# Patient Record
Sex: MALE | Race: WHITE | NOT HISPANIC OR LATINO | Employment: FULL TIME | URBAN - METROPOLITAN AREA
[De-identification: names, ages, dates, MRNs, and addresses within clinical notes are randomized per-mention and may not be internally consistent; named-entity substitution may affect disease eponyms.]

---

## 2019-04-06 ENCOUNTER — HOSPITAL ENCOUNTER (OUTPATIENT)
Dept: OTHER | Facility: HOSPITAL | Age: 44
Discharge: HOME | End: 2019-04-06
Attending: FAMILY MEDICINE

## 2022-04-21 ENCOUNTER — OFFICE VISIT (OUTPATIENT)
Dept: FAMILY MEDICINE CLINIC | Facility: CLINIC | Age: 47
End: 2022-04-21

## 2022-04-21 VITALS
SYSTOLIC BLOOD PRESSURE: 127 MMHG | OXYGEN SATURATION: 100 % | BODY MASS INDEX: 23.55 KG/M2 | RESPIRATION RATE: 18 BRPM | HEIGHT: 72 IN | HEART RATE: 70 BPM | TEMPERATURE: 98 F | DIASTOLIC BLOOD PRESSURE: 91 MMHG | WEIGHT: 173.9 LBS

## 2022-04-21 DIAGNOSIS — Z13.220 SCREENING FOR LIPID DISORDERS: ICD-10-CM

## 2022-04-21 DIAGNOSIS — R53.83 FATIGUE, UNSPECIFIED TYPE: ICD-10-CM

## 2022-04-21 DIAGNOSIS — Z13.29 SCREENING FOR THYROID DISORDER: ICD-10-CM

## 2022-04-21 DIAGNOSIS — Z12.11 SCREEN FOR COLON CANCER: Primary | ICD-10-CM

## 2022-04-21 DIAGNOSIS — Z11.59 NEED FOR HEPATITIS C SCREENING TEST: ICD-10-CM

## 2022-04-21 PROCEDURE — 99204 OFFICE O/P NEW MOD 45 MIN: CPT

## 2022-04-21 NOTE — PROGRESS NOTES
Chief Complaint   Patient presents with   • Establish Care     Hasn't been going to a primary care since he was a child.    • Labs Only       Subjective          Molina Hughes presents to [facility]    He is here to establish care. Is is a recovering alcoholic. He's been sober for over a year. He has arthritis issues and takes tylenol or ibuprofen for the pain. He does not smoke. He does dip. Hes been dipping since he was 13 years old or so. He does not use street drugs but has in the past. (Has used acid, marijuana, etc). No prescription drug abuse.  He has herniated discs in his back. 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     He has no complaints today except for fatigue which he expects to be because of his age. He does go to counseling with his wife for anger issues. He has stated the only reason he is here today is because he wife wants him to be and he doesn't like medical practices.       Past History:  Medical History: has a past medical history of Allergic and Arthritis.   Surgical History: has no past surgical history on file.   Family History: family history includes Cancer in his father and mother.   Social History: reports that he has never smoked. His smokeless tobacco use includes chew. He reports previous alcohol use. Drug use questions deferred to the physician.  Allergies: Penicillins  (Not in a hospital admission)       Social History     Socioeconomic History   • Marital status:    Tobacco Use   • Smoking status: Never Smoker   • Smokeless tobacco: Current User     Types: Chew   • Tobacco comment: Has used for 33 years 1 can per day.   Vaping Use   • Vaping Use: Never used   Substance and Sexual Activity   • Alcohol use: Not Currently   • Drug use: Defer   • Sexual activity: Defer       Health Maintenance Due   Topic Date Due   • COLORECTAL CANCER SCREENING  Never done   • HEPATITIS C SCREENING  Never done  "      Objective     Vital Signs:   /91 (BP Location: Left arm, Patient Position: Sitting)   Pulse 70   Temp 98 °F (36.7 °C) (Temporal)   Resp 18   Ht 182.9 cm (72\")   Wt 78.9 kg (173 lb 14.4 oz)   SpO2 100%   BMI 23.59 kg/m²       Physical Exam  Constitutional:       Appearance: Normal appearance.   HENT:      Nose: Nose normal.      Mouth/Throat:      Mouth: Mucous membranes are moist.   Cardiovascular:      Rate and Rhythm: Normal rate and regular rhythm.      Pulses: Normal pulses.      Heart sounds: Normal heart sounds.   Pulmonary:      Effort: Pulmonary effort is normal.      Breath sounds: Normal breath sounds.   Skin:     General: Skin is warm and dry.   Neurological:      General: No focal deficit present.      Mental Status: He is alert and oriented to person, place, and time.   Psychiatric:         Mood and Affect: Affect is blunt.         Behavior: Behavior is agitated.          Review of Systems   Constitutional: Positive for fatigue.   All other systems reviewed and are negative.       Result Review :                 Assessment and Plan    Diagnoses and all orders for this visit:    1. Screen for colon cancer (Primary)  -     Cologuard - Stool, Per Rectum; Future    2. Screening for lipid disorders  -     Lipid Panel; Future    3. Screening for thyroid disorder  -     TSH; Future    4. Fatigue, unspecified type  -     CBC & Differential; Future  -     Comprehensive Metabolic Panel; Future    5. Need for hepatitis C screening test  -     Hepatitis C antibody; Future          Pt thought to be clinically stable at this time.    Follow Up   Return if symptoms worsen or fail to improve.  Patient was given instructions and counseling regarding his condition or for health maintenance advice. Please see specific information pulled into the AVS if appropriate.       "

## 2022-05-21 ENCOUNTER — LAB (OUTPATIENT)
Dept: LAB | Facility: HOSPITAL | Age: 47
End: 2022-05-21

## 2022-05-21 DIAGNOSIS — Z13.220 SCREENING FOR LIPID DISORDERS: ICD-10-CM

## 2022-05-21 DIAGNOSIS — R79.89 ABNORMAL CBC: ICD-10-CM

## 2022-05-21 DIAGNOSIS — Z13.29 SCREENING FOR THYROID DISORDER: ICD-10-CM

## 2022-05-21 DIAGNOSIS — Z80.9 FAMILY HISTORY OF CANCER: ICD-10-CM

## 2022-05-21 DIAGNOSIS — R53.83 FATIGUE, UNSPECIFIED TYPE: ICD-10-CM

## 2022-05-21 DIAGNOSIS — Z12.5 SCREENING FOR PROSTATE CANCER: ICD-10-CM

## 2022-05-21 DIAGNOSIS — R42 DIZZINESS: ICD-10-CM

## 2022-05-21 DIAGNOSIS — Z11.59 NEED FOR HEPATITIS C SCREENING TEST: ICD-10-CM

## 2022-05-21 LAB
ALBUMIN SERPL-MCNC: 4.6 G/DL (ref 3.5–5.2)
ALBUMIN/GLOB SERPL: 2.4 G/DL
ALP SERPL-CCNC: 54 U/L (ref 39–117)
ALT SERPL W P-5'-P-CCNC: 23 U/L (ref 1–41)
ANION GAP SERPL CALCULATED.3IONS-SCNC: 9.9 MMOL/L (ref 5–15)
ANISOCYTOSIS BLD QL: ABNORMAL
AST SERPL-CCNC: 19 U/L (ref 1–40)
BILIRUB SERPL-MCNC: 0.7 MG/DL (ref 0–1.2)
BUN SERPL-MCNC: 12 MG/DL (ref 6–20)
BUN/CREAT SERPL: 15.6 (ref 7–25)
CALCIUM SPEC-SCNC: 9.2 MG/DL (ref 8.6–10.5)
CHLORIDE SERPL-SCNC: 102 MMOL/L (ref 98–107)
CHOLEST SERPL-MCNC: 113 MG/DL (ref 0–200)
CO2 SERPL-SCNC: 26.1 MMOL/L (ref 22–29)
CREAT SERPL-MCNC: 0.77 MG/DL (ref 0.76–1.27)
DACRYOCYTES BLD QL SMEAR: ABNORMAL
DEPRECATED RDW RBC AUTO: 37 FL (ref 37–54)
EGFRCR SERPLBLD CKD-EPI 2021: 112.5 ML/MIN/1.73
ERYTHROCYTE [DISTWIDTH] IN BLOOD BY AUTOMATED COUNT: 16.1 % (ref 12.3–15.4)
GLOBULIN UR ELPH-MCNC: 1.9 GM/DL
GLUCOSE SERPL-MCNC: 90 MG/DL (ref 65–99)
HCT VFR BLD AUTO: 37.5 % (ref 37.5–51)
HCV AB SER DONR QL: NORMAL
HDLC SERPL-MCNC: 27 MG/DL (ref 40–60)
HGB BLD-MCNC: 11.4 G/DL (ref 13–17.7)
LDLC SERPL CALC-MCNC: 72 MG/DL (ref 0–100)
LDLC/HDLC SERPL: 2.7 {RATIO}
LYMPHOCYTES # BLD MANUAL: 1.37 10*3/MM3 (ref 0.7–3.1)
LYMPHOCYTES NFR BLD MANUAL: 9 % (ref 5–12)
MCH RBC QN AUTO: 20.7 PG (ref 26.6–33)
MCHC RBC AUTO-ENTMCNC: 30.4 G/DL (ref 31.5–35.7)
MCV RBC AUTO: 67.9 FL (ref 79–97)
MICROCYTES BLD QL: ABNORMAL
MONOCYTES # BLD: 0.44 10*3/MM3 (ref 0.1–0.9)
NEUTROPHILS # BLD AUTO: 3.08 10*3/MM3 (ref 1.7–7)
NEUTROPHILS NFR BLD MANUAL: 63 % (ref 42.7–76)
NRBC SPEC MANUAL: 1 /100 WBC (ref 0–0.2)
OVALOCYTES BLD QL SMEAR: ABNORMAL
PLAT MORPH BLD: NORMAL
PLATELET # BLD AUTO: 215 10*3/MM3 (ref 140–450)
PMV BLD AUTO: 12.3 FL (ref 6–12)
POIKILOCYTOSIS BLD QL SMEAR: ABNORMAL
POTASSIUM SERPL-SCNC: 4.5 MMOL/L (ref 3.5–5.2)
PROT SERPL-MCNC: 6.5 G/DL (ref 6–8.5)
RBC # BLD AUTO: 5.52 10*6/MM3 (ref 4.14–5.8)
SODIUM SERPL-SCNC: 138 MMOL/L (ref 136–145)
TRIGL SERPL-MCNC: 66 MG/DL (ref 0–150)
TSH SERPL DL<=0.05 MIU/L-ACNC: 0.81 UIU/ML (ref 0.27–4.2)
VARIANT LYMPHS NFR BLD MANUAL: 28 % (ref 19.6–45.3)
VLDLC SERPL-MCNC: 14 MG/DL (ref 5–40)
WBC MORPH BLD: NORMAL
WBC NRBC COR # BLD: 4.89 10*3/MM3 (ref 3.4–10.8)

## 2022-05-21 PROCEDURE — 84443 ASSAY THYROID STIM HORMONE: CPT

## 2022-05-21 PROCEDURE — 86803 HEPATITIS C AB TEST: CPT

## 2022-05-21 PROCEDURE — 85007 BL SMEAR W/DIFF WBC COUNT: CPT

## 2022-05-21 PROCEDURE — 36415 COLL VENOUS BLD VENIPUNCTURE: CPT

## 2022-05-21 PROCEDURE — 80053 COMPREHEN METABOLIC PANEL: CPT

## 2022-05-21 PROCEDURE — G0103 PSA SCREENING: HCPCS

## 2022-05-21 PROCEDURE — 80061 LIPID PANEL: CPT

## 2022-05-21 PROCEDURE — 84466 ASSAY OF TRANSFERRIN: CPT

## 2022-05-21 PROCEDURE — 83540 ASSAY OF IRON: CPT

## 2022-05-21 PROCEDURE — 85025 COMPLETE CBC W/AUTO DIFF WBC: CPT

## 2022-05-23 DIAGNOSIS — R42 DIZZINESS: ICD-10-CM

## 2022-05-23 DIAGNOSIS — Z80.9 FAMILY HISTORY OF CANCER: ICD-10-CM

## 2022-05-23 DIAGNOSIS — R79.89 ABNORMAL CBC: Primary | ICD-10-CM

## 2022-05-23 DIAGNOSIS — Z12.5 SCREENING FOR PROSTATE CANCER: ICD-10-CM

## 2022-05-23 LAB
IRON 24H UR-MRATE: 129 MCG/DL (ref 59–158)
IRON SATN MFR SERPL: 40 % (ref 20–50)
PSA SERPL-MCNC: 0.78 NG/ML (ref 0–4)
TIBC SERPL-MCNC: 319 MCG/DL (ref 298–536)
TRANSFERRIN SERPL-MCNC: 214 MG/DL (ref 200–360)

## 2022-06-11 ENCOUNTER — LAB (OUTPATIENT)
Dept: LAB | Facility: HOSPITAL | Age: 47
End: 2022-06-11

## 2022-06-11 DIAGNOSIS — R79.89 ABNORMAL CBC: ICD-10-CM

## 2022-06-11 LAB
DEPRECATED RDW RBC AUTO: 36.2 FL (ref 37–54)
ERYTHROCYTE [DISTWIDTH] IN BLOOD BY AUTOMATED COUNT: 15.7 % (ref 12.3–15.4)
HCT VFR BLD AUTO: 35 % (ref 37.5–51)
HGB BLD-MCNC: 10.8 G/DL (ref 13–17.7)
LYMPHOCYTES # BLD MANUAL: 1.33 10*3/MM3 (ref 0.7–3.1)
LYMPHOCYTES NFR BLD MANUAL: 9 % (ref 5–12)
MCH RBC QN AUTO: 20.7 PG (ref 26.6–33)
MCHC RBC AUTO-ENTMCNC: 30.9 G/DL (ref 31.5–35.7)
MCV RBC AUTO: 67 FL (ref 79–97)
MONOCYTES # BLD: 0.46 10*3/MM3 (ref 0.1–0.9)
NEUTROPHILS # BLD AUTO: 3.33 10*3/MM3 (ref 1.7–7)
NEUTROPHILS NFR BLD MANUAL: 65 % (ref 42.7–76)
PLAT MORPH BLD: NORMAL
PLATELET # BLD AUTO: 204 10*3/MM3 (ref 140–450)
RBC # BLD AUTO: 5.22 10*6/MM3 (ref 4.14–5.8)
RBC MORPH BLD: NORMAL
VARIANT LYMPHS NFR BLD MANUAL: 26 % (ref 19.6–45.3)
WBC MORPH BLD: NORMAL
WBC NRBC COR # BLD: 5.13 10*3/MM3 (ref 3.4–10.8)

## 2022-06-11 PROCEDURE — 85025 COMPLETE CBC W/AUTO DIFF WBC: CPT

## 2022-06-11 PROCEDURE — 85007 BL SMEAR W/DIFF WBC COUNT: CPT

## 2022-06-11 PROCEDURE — 36415 COLL VENOUS BLD VENIPUNCTURE: CPT

## 2022-06-13 ENCOUNTER — OFFICE VISIT (OUTPATIENT)
Dept: FAMILY MEDICINE CLINIC | Facility: CLINIC | Age: 47
End: 2022-06-13

## 2022-06-13 VITALS
TEMPERATURE: 98.6 F | RESPIRATION RATE: 16 BRPM | DIASTOLIC BLOOD PRESSURE: 79 MMHG | HEART RATE: 79 BPM | WEIGHT: 173 LBS | HEIGHT: 72 IN | BODY MASS INDEX: 23.43 KG/M2 | SYSTOLIC BLOOD PRESSURE: 124 MMHG | OXYGEN SATURATION: 98 %

## 2022-06-13 DIAGNOSIS — D64.9 LOW HEMOGLOBIN: ICD-10-CM

## 2022-06-13 DIAGNOSIS — R68.82 LOW LIBIDO: ICD-10-CM

## 2022-06-13 DIAGNOSIS — R53.83 FATIGUE, UNSPECIFIED TYPE: Primary | ICD-10-CM

## 2022-06-13 DIAGNOSIS — D56.3 BETA THALASSEMIA MINOR: ICD-10-CM

## 2022-06-13 PROCEDURE — 99214 OFFICE O/P EST MOD 30 MIN: CPT

## 2022-06-13 NOTE — PROGRESS NOTES
"Chief Complaint   Patient presents with   • Diarrhea     Right after he eats he is going to the bathroom, his brother had crohn's disease.        Subjective          Molina Hughes presents to [facility]    He is here for a follow up visit on his labs.     He is complaining of having stool within minutes after eating. He said when he eats he had diarrhea within minutes. He does not want to have a colonoscopy done and states he will just deal with it.    He states his sex drive is also low and he is wondering if his testosterone is low. He has a history of beta thalassemia minor.      Past History:  Medical History: has a past medical history of Allergic and Arthritis.   Surgical History: has no past surgical history on file.   Family History: family history includes Cancer in his father and mother.   Social History: reports that he has never smoked. His smokeless tobacco use includes chew. He reports previous alcohol use. Drug use questions deferred to the physician.  Allergies: Penicillins  (Not in a hospital admission)       Social History     Socioeconomic History   • Marital status:    Tobacco Use   • Smoking status: Never Smoker   • Smokeless tobacco: Current User     Types: Chew   • Tobacco comment: Has used for 33 years 1 can per day.   Vaping Use   • Vaping Use: Never used   Substance and Sexual Activity   • Alcohol use: Not Currently   • Drug use: Defer   • Sexual activity: Defer       There are no preventive care reminders to display for this patient.    Objective     Vital Signs:   /79 (BP Location: Left arm, Patient Position: Sitting)   Pulse 79   Temp 98.6 °F (37 °C) (Infrared)   Resp 16   Ht 182.9 cm (72\")   Wt 78.5 kg (173 lb)   SpO2 98%   BMI 23.46 kg/m²       Physical Exam  Constitutional:       Appearance: Normal appearance.   HENT:      Nose: Nose normal.      Mouth/Throat:      Mouth: Mucous membranes are moist.   Cardiovascular:      Rate and " Rhythm: Normal rate and regular rhythm.      Pulses: Normal pulses.      Heart sounds: Normal heart sounds.   Pulmonary:      Effort: Pulmonary effort is normal.      Breath sounds: Normal breath sounds.   Skin:     General: Skin is warm and dry.   Neurological:      General: No focal deficit present.      Mental Status: He is alert and oriented to person, place, and time.   Psychiatric:         Mood and Affect: Mood normal.         Behavior: Behavior normal.          Review of Systems   All other systems reviewed and are negative.       Result Review :                 Assessment and Plan    Diagnoses and all orders for this visit:    1. Fatigue, unspecified type (Primary)  -     Testosterone, Free, Total; Future  -     Iron Profile; Future    2. Low libido  -     Testosterone, Free, Total; Future    3. Low hemoglobin  -     CBC w AUTO Differential; Future  -     Iron Profile; Future    4. Beta thalassemia minor    I am going to have him get labs drawn again to make sure his hemoglobin is starting to level out because it has continued to drop. He will also get iron and testosterone checked.       Pt thought to be clinically stable at this time.    Follow Up   Return if symptoms worsen or fail to improve.  Patient was given instructions and counseling regarding his condition or for health maintenance advice. Please see specific information pulled into the AVS if appropriate.

## 2022-12-27 ENCOUNTER — TELEPHONE (OUTPATIENT)
Dept: FAMILY MEDICINE CLINIC | Facility: CLINIC | Age: 47
End: 2022-12-27

## 2022-12-27 NOTE — TELEPHONE ENCOUNTER
Caller: ry larose    Relationship: Emergency Contact    Best call back number:[phone]     Who is your current provider: JA GARCIA    Who would you like your new provider to be: DR. HOBSON    What are your reasons for transferring care: PATIENT'S WIFE WOULD LIKE THEM TO SEE THE SAME PROVIDER.     Additional notes: [facility] SCHEDULED NEW PATIENT APPOINTMENT WITH DR. HOBSON FOR 1.5.22

## 2023-01-09 ENCOUNTER — OFFICE VISIT (OUTPATIENT)
Dept: FAMILY MEDICINE CLINIC | Facility: CLINIC | Age: 48
End: 2023-01-09
Payer: COMMERCIAL

## 2023-01-09 VITALS
BODY MASS INDEX: 24.09 KG/M2 | DIASTOLIC BLOOD PRESSURE: 84 MMHG | RESPIRATION RATE: 20 BRPM | TEMPERATURE: 98.4 F | OXYGEN SATURATION: 97 % | HEIGHT: 72 IN | WEIGHT: 177.9 LBS | HEART RATE: 80 BPM | SYSTOLIC BLOOD PRESSURE: 134 MMHG

## 2023-01-09 DIAGNOSIS — E73.9 LACTOSE INTOLERANCE: Primary | ICD-10-CM

## 2023-01-09 PROCEDURE — 99213 OFFICE O/P EST LOW 20 MIN: CPT | Performed by: FAMILY MEDICINE

## 2023-01-09 RX ORDER — ALPHA-D-GALACTOSIDASE
1 TABLET ORAL DAILY
Qty: 90 EACH | Refills: 3 | Status: SHIPPED | OUTPATIENT
Start: 2023-01-09

## 2023-02-01 NOTE — PROGRESS NOTES
"Chief Complaint  Diarrhea (After meals x10 years.)    Subjective          Molina Hughes presents to [facility]  History of Present Illness    Patient presents complaining of diarrhea worse after meals ongoing for years without improvement    Objective   Vital Signs:   /84 (BP Location: Left arm, Patient Position: Sitting, Cuff Size: Adult)   Pulse 80   Temp 98.4 °F (36.9 °C) (Temporal)   Resp 20   Ht 182.9 cm (72\")   Wt 80.7 kg (177 lb 14.4 oz)   SpO2 97%   BMI 24.13 kg/m²     BMI is within normal parameters. No other follow-up for BMI required.      Physical Exam  Vitals reviewed.   Constitutional:       Appearance: Normal appearance. He is well-developed.   HENT:      Head: Normocephalic and atraumatic.      Right Ear: External ear normal.      Left Ear: External ear normal.      Nose: Nose normal.   Eyes:      Conjunctiva/sclera: Conjunctivae normal.      Pupils: Pupils are equal, round, and reactive to light.   Cardiovascular:      Rate and Rhythm: Normal rate.   Pulmonary:      Effort: Pulmonary effort is normal.      Breath sounds: Normal breath sounds.   Abdominal:      General: There is no distension.   Skin:     General: Skin is warm and dry.   Neurological:      General: No focal deficit present.      Mental Status: He is alert and oriented to person, place, and time.   Psychiatric:         Mood and Affect: Mood and affect normal.         Behavior: Behavior normal.         Thought Content: Thought content normal.         Judgment: Judgment normal.          Result Review :   The following data was reviewed by: Torres Harley DO on 01/09/2023:  Common labs    Common Labs 5/21/22 5/21/22 5/21/22 5/21/22 6/11/22    [id number]    Glucose   90     BUN   12     Creatinine   0.77     Sodium   138     Potassium   4.5     Chloride   102     Calcium   9.2     Albumin   4.60     Total Bilirubin   0.7     Alkaline Phosphatase   54     AST (SGOT)   19     ALT (SGPT) "   23     WBC  4.89   5.13   Hemoglobin  11.4 (A)   10.8 (A)   Hematocrit  37.5   35.0 (A)   Platelets  215   204   Total Cholesterol 113       Triglycerides 66       HDL Cholesterol 27 (A)       LDL Cholesterol  72       PSA    0.777    (A) Abnormal value                          Assessment and Plan    Diagnoses and all orders for this visit:    1. Lactose intolerance (Primary)  -     Alpha-D-Galactosidase (Beano) tablet; Take 1 tablet by mouth Daily.  Dispense: 90 each; Refill: 3      Discussed that with patient at length counseled on avoiding dairy and possibly similar lactose containing foods take Beano as needed and consider follow-up with GI        Follow Up   Return for PCP.  Patient was given instructions and counseling regarding his condition or for health maintenance advice. Please see specific information pulled into the AVS if appropriate.     Transcribed from ambient dictation for Torres Harley DO by Torres Harley DO.  02/01/23   13:26 EST    Patient or patient representative verbalized consent to the visit recording.  I have personally performed the services described in this document as transcribed by the above individual, and it is both accurate and complete.